# Patient Record
Sex: MALE | Race: BLACK OR AFRICAN AMERICAN
[De-identification: names, ages, dates, MRNs, and addresses within clinical notes are randomized per-mention and may not be internally consistent; named-entity substitution may affect disease eponyms.]

---

## 2017-05-06 ENCOUNTER — HOSPITAL ENCOUNTER (EMERGENCY)
Dept: HOSPITAL 72 - EMR | Age: 27
Discharge: HOME | End: 2017-05-06
Payer: SELF-PAY

## 2017-05-06 VITALS — DIASTOLIC BLOOD PRESSURE: 76 MMHG | SYSTOLIC BLOOD PRESSURE: 115 MMHG

## 2017-05-06 VITALS — HEIGHT: 68 IN | BODY MASS INDEX: 25.76 KG/M2 | WEIGHT: 170 LBS

## 2017-05-06 DIAGNOSIS — N34.2: Primary | ICD-10-CM

## 2017-05-06 LAB
AMORPH SED URNS QL MICRO: (no result) /LPF
APPEARANCE UR: CLEAR
BACTERIA #/AREA URNS HPF: (no result) /HPF
KETONES UR QL STRIP: NEGATIVE
LEUKOCYTE ESTERASE UR QL STRIP: (no result)
NITRITE UR QL STRIP: NEGATIVE
PH UR STRIP: 7 [PH] (ref 4.5–8)
PROT UR QL STRIP: NEGATIVE
RBC #/AREA URNS HPF: (no result) /HPF (ref 0–0)
SP GR UR STRIP: 1.01 (ref 1–1.03)
SQUAMOUS #/AREA URNS LPF: (no result) /LPF
UROBILINOGEN UR-MCNC: 1 MG/DL (ref 0–1)
WBC #/AREA URNS HPF: (no result) /HPF (ref 0–0)

## 2017-05-06 PROCEDURE — 81003 URINALYSIS AUTO W/O SCOPE: CPT

## 2017-05-06 PROCEDURE — 99284 EMERGENCY DEPT VISIT MOD MDM: CPT

## 2017-05-06 NOTE — EMERGENCY ROOM REPORT
History of Present Illness


General


Chief Complaint:  Male Urogenital Problems


Source:  Patient





Present Illness


HPI


Patient's 27-year-old male who presented after increased difficulty with 

urination.  Patient gradual onset of symptoms.  Patient stated recent 

unprotected sex approximately 3 days ago.  Patient states that he had no recent 

fever.  He denied any testicular pain.  He denied any urethral discharge.  He 

reports having intermittent burning sensation.  He denies any fever.  He had 

not been vomiting.  He denied any flank pain.


Allergies:  


Coded Allergies:  


     No Known Allergies (Unverified , 5/6/17)





Patient History


Past Medical History:  see triage record


Reviewed Nursing Documentation:  PMH: Agreed, PSxH: Agreed





Review of Systems


All Other Systems:  negative except mentioned in HPI





Physical Exam





Vital Signs








  Date Time  Temp Pulse Resp B/P Pulse Ox O2 Delivery O2 Flow Rate FiO2


 


5/6/17 13:53 97.9 60 16 114/74 99 Room Air  








General Appearance:  well appearing, no apparent distress, alert, GCS 15


Head:  normocephalic, atraumatic


ENT:  hearing grossly normal, normal voice


Neck:  full range of motion, supple


Respiratory:  no respiratory distress, speaking full sentences


Gastrointestinal:  normal inspection, normal bowel sounds, non tender, soft


Genitourinary:  normal inspection, penis normal, scrotum normal


Musculoskeletal:  normal inspection, back normal, digits/nails normal, no calf 

tenderness


Neurologic:  normal inspection, alert, oriented x3, responsive, CNs III-XII nml 

as tested, normal gait


Psychiatric:  mood/affect normal


Skin:  no rash





Medical Decision Making


Diagnostic Impression:  


 Primary Impression:  


 Urethritis


ER Course


Patient presented for dysuria.  Differential diagnosis included was not limited 

to appendicitis, urinary tract infection, pelvic inflammatory disease, 

urethritis, herpes among others. Patient's benign exam and does not appear to 

require any further imaging or laboratory testing at this time.  Patient was 

given empiric treatment for  urethritis.  He is advised to followup with 

outpatient testing for a sexually  transmitted infections. The patient is 

advised to follow up with  primary care doctor in 1-2 days.  Patient is advised 

to return if any worsening condition or if any changes in status that are 

concerning.





Last Vital Signs








  Date Time  Temp Pulse Resp B/P Pulse Ox O2 Delivery O2 Flow Rate FiO2


 


5/6/17 13:53 97.9 60 16 114/74 99 Room Air  








Status:  improved


Disposition:  HOME, SELF-CARE


Condition:  Stable


Scripts


Acyclovir* (ZOVIRAX*) 800 Mg Tablet


800 MG ORAL FIVE TIMES A DAY, #35 TAB


   Prov: Casper Bennett         5/6/17 


Doxycycline Monohydrate* (DOXYCYCLINE MONOHYDRATE*) 100 Mg Capsule


100 MG ORAL Q12H, #14 CAP 0 Refills


   Prov: Casper Bennett         5/6/17


Patient Instructions:  Urethritis, Adult











Casper Bennett May 6, 2017 14:20

## 2017-11-02 ENCOUNTER — HOSPITAL ENCOUNTER (EMERGENCY)
Dept: HOSPITAL 72 - EMR | Age: 27
Discharge: HOME | End: 2017-11-02
Payer: SELF-PAY

## 2017-11-02 VITALS — WEIGHT: 180 LBS | HEIGHT: 68 IN | BODY MASS INDEX: 27.28 KG/M2

## 2017-11-02 VITALS — DIASTOLIC BLOOD PRESSURE: 85 MMHG | SYSTOLIC BLOOD PRESSURE: 148 MMHG

## 2017-11-02 DIAGNOSIS — L29.8: Primary | ICD-10-CM

## 2017-11-02 PROCEDURE — 96372 THER/PROPH/DIAG INJ SC/IM: CPT

## 2017-11-02 PROCEDURE — 81003 URINALYSIS AUTO W/O SCOPE: CPT

## 2017-11-02 PROCEDURE — 99284 EMERGENCY DEPT VISIT MOD MDM: CPT

## 2017-11-02 NOTE — EMERGENCY ROOM REPORT
History of Present Illness


General


Chief Complaint:  Male Urogenital Problems


Source:  Patient





Present Illness


\[facility] [place] [facility]\""


The patient is a 27-year-old male Presenting for possible STD exposure.  He 

states that he has been within the female partner and has been having penile 

itching.  This has been going on for the past week.  She denies seeing any 

lesions and denies any pain.  He denies penile discharge or dysuria.  He denies 

any other symptoms including N, V, F, chills, back pain, hematuria


Allergies:  


Coded Allergies:  


     No Known Allergies (Unverified , 5/6/17)





Patient History


Past Medical History:  see triage record


Pertinent Family History:  none


Reviewed Nursing Documentation:  PMH: Agreed, PSxH: Agreed





Nursing Documentation-PMH


Past Medical History:  No History, Except For





Review of Systems


All Other Systems:  negative except mentioned in HPI





Physical Exam





Vital Signs








  Date Time  Temp Pulse Resp B/P (MAP) Pulse Ox O2 Delivery O2 Flow Rate FiO2


 


11/2/17 17:52 98.1 89 16 148/85 99 Room Air  








Sp02 EP Interpretation:  reviewed, normal


General Appearance:  no apparent distress, alert, GCS 15, non-toxic


Head:  normocephalic, atraumatic


Eyes:  bilateral eye normal inspection, bilateral eye PERRL


ENT:  hearing grossly normal, normal pharynx, no angioedema, normal voice


Neck:  full range of motion, supple/symm/no masses


Respiratory:  chest non-tender, lungs clear, normal breath sounds, speaking 

full sentences


Genitourinary:  normal inspection, no CVA tenderness, penis normal, scrotum 

normal


Musculoskeletal:  back normal, gait/station normal, normal range of motion, non-

tender


Neurologic:  alert, oriented x3, responsive, motor strength/tone normal, 

sensory intact, speech normal


Psychiatric:  judgement/insight normal, memory normal, mood/affect normal, no 

suicidal/homicidal ideation


Skin:  normal color, no rash, warm/dry, well hydrated





Medical Decision Making


PA Attestation


Dr. Paul is my supervising physician. Patient management was discussed with my 

supervising physician


Diagnostic Impression:  


 Primary Impression:  


 Possible exposure to STD


ER Course


The patient is a 27-year-old male Presenting for possible STD exposure.





Differential diagnoses considered but not limited to: Gonorrhea, Chlamydia, 

herpes, urinary tract infection, among others





PE: Vitals WNL.


NAD. 


Abdomen: Normal appearance. Non distended. No ecchymosis. 


Normal BS. Non TTP. No McBurney point tenderness. No guarding. 


: normal appearance. No rash. No lesions. Non tender. No testicular 

tenderness.


No CVA tenderness





UA: few bacteria with 1+ LH. 





He will be treated for G&C and will be DC'ed home. ER precautions given





Laboratory Tests








Test


  11/2/17


17:32


 


Urine Color Yellow  


 


Urine Appearance Clear  


 


Urine pH 7 (4.5-8.0)  


 


Urine Specific Gravity


  1.015


(1.005-1.035)


 


Urine Protein


  Negative


(NEGATIVE)


 


Urine Glucose (UA)


  Negative


(NEGATIVE)


 


Urine Ketones


  Negative


(NEGATIVE)


 


Urine Occult Blood


  Negative


(NEGATIVE)


 


Urine Nitrite


  Negative


(NEGATIVE)


 


Urine Bilirubin


  Negative


(NEGATIVE)


 


Urine Urobilinogen


  1 MG/DL


(0.0-1.0)  H


 


Urine Leukocyte Esterase


  1+ (NEGATIVE)


H


 


Urine RBC


  0-2 /HPF (0 -


0)  H


 


Urine WBC


  2-4 /HPF (0 -


0)


 


Urine Squamous Epithelial


Cells None /LPF


(NONE/OCC)


 


Urine Amorphous Sediment


  Few /LPF


(NONE)  H


 


Urine Bacteria


  Few /HPF


(NONE)








Lab Results Impression


UA: few bacteria with 1+ LH.





Last Vital Signs








  Date Time  Temp Pulse Resp B/P (MAP) Pulse Ox O2 Delivery O2 Flow Rate FiO2


 


11/2/17 19:06 98.1 69 16 148/85 99 Room Air  








Status:  improved


Disposition:  HOME, SELF-CARE


Condition:  Improved


Patient Instructions:  Safe Sex





Additional Instructions:  


I discussed my findings with the patient. All questions and concerns have been 

answered. Treatment and medication compliance have been addressed. I advised 

the patient that they need to follow up with primary doctor within 5 days. 

Return to ED if symptoms worsen, new symptoms arise, or if needed for any 

reason. Patient verbalized understanding of discharge instructions.











MARYELLEN YIP Nov 2, 2017 20:51

## 2018-10-02 ENCOUNTER — HOSPITAL ENCOUNTER (EMERGENCY)
Dept: HOSPITAL 72 - EMR | Age: 28
Discharge: HOME | End: 2018-10-02
Payer: SELF-PAY

## 2018-10-02 VITALS — HEIGHT: 67 IN | BODY MASS INDEX: 29.82 KG/M2 | WEIGHT: 190 LBS

## 2018-10-02 VITALS — SYSTOLIC BLOOD PRESSURE: 113 MMHG | DIASTOLIC BLOOD PRESSURE: 71 MMHG

## 2018-10-02 DIAGNOSIS — Z02.89: Primary | ICD-10-CM

## 2018-10-02 DIAGNOSIS — I10: ICD-10-CM

## 2018-10-02 PROCEDURE — 99281 EMR DPT VST MAYX REQ PHY/QHP: CPT

## 2018-10-02 NOTE — EMERGENCY ROOM REPORT
History of Present Illness


General


Chief Complaint:  Hypertension


Source:  Patient





Present Illness


HPI


Patient presents emergency department today for medical clearance.  Patient has 

hypertension and has been taking blood pressure medications.  He is compliant 

with his medications.  He states that he started taking it about 3 days ago.  

He denies any headache chest pain shortness breath.  He is currently in police 

custody.  Presents here for medical clearance.No other modifying factors.  No 

other associated signs and symptoms.  No other complaints were noted.


Allergies:  


Coded Allergies:  


     No Known Allergies (Unverified , 5/6/17)





Patient History


Past Medical History:  HTN


Past Surgical History:  none


Pertinent Family History:  none


Social History:  Denies: smoking, alcohol use, drug use


Reviewed Nursing Documentation:  PMH: Agreed; PSxH: Agreed





Nursing Documentation-PMH


Past Medical History:  No History, Except For





Review of Systems


All Other Systems:  negative except mentioned in HPI





Physical Exam





Vital Signs








  Date Time  Temp Pulse Resp B/P (MAP) Pulse Ox O2 Delivery O2 Flow Rate FiO2


 


10/2/18 18:31 98.7 95 20 113/71 98 Room Air  





 98.8       








Sp02 EP Interpretation:  reviewed, normal


General Appearance:  normal inspection, well appearing, no apparent distress, 

alert


Head:  atraumatic


Eyes:  bilateral eye normal inspection


ENT:  normal ENT inspection, hearing grossly normal, normal voice


Neck:  normal inspection, full range of motion, supple, no bony tend


Respiratory:  normal inspection, lungs clear, normal breath sounds, no 

respiratory distress, no retraction, no wheezing


Cardiovascular #1:  regular rate, rhythm, no edema


Gastrointestinal:  normal inspection, normal bowel sounds, non tender, soft, no 

guarding, no hernia


Genitourinary:  no CVA tenderness


Musculoskeletal:  normal inspection, back normal, normal range of motion


Neurologic:  normal inspection, alert, responsive, speech normal


Psychiatric:  normal inspection, judgement/insight normal, mood/affect normal


Skin:  normal inspection, normal color, no rash





Medical Decision Making


Diagnostic Impression:  


 Primary Impression:  


 Hypertension


 Additional Impression:  


 Medical clearance for incarceration


ER Course


Patient presents emergency department today complaining of hypertension.  

Patient's exam is normal.  Patient does not have any complaints.  Therefore no 

further workups indicated at this time.  Patient is medically cleared.  

Recommend outpatient follow-up.  Patient will be released back to police 

custody.





Last Vital Signs








  Date Time  Temp Pulse Resp B/P (MAP) Pulse Ox O2 Delivery O2 Flow Rate FiO2


 


10/2/18 18:31 98.7 95 20 113/71 98 Room Air  





 98.8       








Status:  improved


Disposition:  HOME, SELF-CARE


Condition:  Stable


Referrals:  


NOT CHOSEN [facility]/MD,REFERRING (PCP)


Departure Forms:  longterm Clearance


Patient Instructions:  Hypertension











Arturo Paul MD Oct 2, 2018 18:41